# Patient Record
Sex: FEMALE | Race: WHITE | Employment: UNEMPLOYED | URBAN - METROPOLITAN AREA
[De-identification: names, ages, dates, MRNs, and addresses within clinical notes are randomized per-mention and may not be internally consistent; named-entity substitution may affect disease eponyms.]

---

## 2018-09-23 ENCOUNTER — HOSPITAL ENCOUNTER (OUTPATIENT)
Dept: GENERAL RADIOLOGY | Age: 48
Discharge: HOME | End: 2018-09-25
Payer: MEDICARE

## 2018-09-23 ENCOUNTER — HOSPITAL ENCOUNTER (OUTPATIENT)
Age: 48
Discharge: HOME | End: 2018-09-25
Payer: MEDICARE

## 2018-09-23 DIAGNOSIS — N20.0 URIC ACID NEPHROLITHIASIS: ICD-10-CM

## 2018-09-23 PROCEDURE — 74018 RADEX ABDOMEN 1 VIEW: CPT

## 2024-03-27 ENCOUNTER — OFFICE VISIT (OUTPATIENT)
Dept: PODIATRY | Age: 54
End: 2024-03-27
Payer: MEDICARE

## 2024-03-27 VITALS — BODY MASS INDEX: 24.73 KG/M2 | WEIGHT: 131 LBS | HEIGHT: 61 IN

## 2024-03-27 DIAGNOSIS — L84 CORNS AND CALLOSITIES: ICD-10-CM

## 2024-03-27 DIAGNOSIS — M77.41 METATARSALGIA OF BOTH FEET: ICD-10-CM

## 2024-03-27 DIAGNOSIS — B35.1 TINEA UNGUIUM: Primary | ICD-10-CM

## 2024-03-27 DIAGNOSIS — M77.42 METATARSALGIA OF BOTH FEET: ICD-10-CM

## 2024-03-27 PROCEDURE — G8484 FLU IMMUNIZE NO ADMIN: HCPCS | Performed by: PODIATRIST

## 2024-03-27 PROCEDURE — 1036F TOBACCO NON-USER: CPT | Performed by: PODIATRIST

## 2024-03-27 PROCEDURE — 3017F COLORECTAL CA SCREEN DOC REV: CPT | Performed by: PODIATRIST

## 2024-03-27 PROCEDURE — 99203 OFFICE O/P NEW LOW 30 MIN: CPT | Performed by: PODIATRIST

## 2024-03-27 PROCEDURE — G8420 CALC BMI NORM PARAMETERS: HCPCS | Performed by: PODIATRIST

## 2024-03-27 PROCEDURE — 11721 DEBRIDE NAIL 6 OR MORE: CPT | Performed by: PODIATRIST

## 2024-03-27 PROCEDURE — G8427 DOCREV CUR MEDS BY ELIG CLIN: HCPCS | Performed by: PODIATRIST

## 2024-03-27 RX ORDER — ASCORBIC ACID 500 MG
500 TABLET ORAL DAILY
COMMUNITY

## 2024-03-27 RX ORDER — DIVALPROEX SODIUM 500 MG/1
500 TABLET, EXTENDED RELEASE ORAL DAILY
COMMUNITY

## 2024-03-27 RX ORDER — FERROUS SULFATE 325(65) MG
325 TABLET ORAL EVERY 12 HOURS
COMMUNITY

## 2024-03-27 RX ORDER — CLONAZEPAM 1 MG/1
1 TABLET ORAL EVERY 6 HOURS
COMMUNITY

## 2024-03-27 RX ORDER — UBIDECARENONE 75 MG
500 CAPSULE ORAL DAILY
COMMUNITY

## 2024-03-27 RX ORDER — AMMONIUM LACTATE 12 G/100G
LOTION TOPICAL
Qty: 400 G | Refills: 2 | Status: SHIPPED | OUTPATIENT
Start: 2024-03-27

## 2024-03-27 NOTE — PROGRESS NOTES
palpable bilateral.    Capillary Refill Time:  Immediate return  Hair growth:  Symmetrical and bilateral   Skin:  Not atrophic  Edema: Mild edema bilateral feet.   Mild edema bilateral ankles.  Neurologic:  Light touch diminished bilateral.  Warm to coolness bilateral distal toes  Decreased epicritic sensation    Musculoskeletal/ Orthopedic examination:    Equinis: present bilateral  Dorsiflexion, plantarflexion, inversion, eversion bilateral 5 out of 5 muscle strength  Wiggling toes  Negative [facility]    Dermatology examination:    Toenails 1 through 5 bilateral thickened, elongated, dystrophic, mycotic with subungual debris.  Web spaces 1 through 4 bilateral clean dry and intact.  Hyperkeratotic tissue noted        Assessment and Plan:  Mckenna was seen today for callouses and nail problem.    Diagnoses and all orders for this visit:    Tinea unguium    Corns and callosities  -     DME Order for (Specify) as OP    Metatarsalgia of both feet  -     DME Order for (Specify) as OP    Other orders  -     ammonium lactate (LAC-HYDRIN) 12 % lotion; Apply topically twice daily        New referral clinical tinea unguium  Did also write an order for full-length custom orthotics with metatarsal padding.  I did dispense metatarsal padding today.  If custom orthotics were not covered we did discuss power step inserts and placing metatarsal padding on the bottom.  Ammonium lactate 12% twice daily.    Formal debridement toenails 1 through 5 right and left with manual debridement for thickness and overall length.    Follow-up 2 months.      Return in about 2 months (around 5/27/2024).     Seen By:  Dejuan Hernandez DPM      Document was created using voice recognition software.  Note was reviewed however may contain grammatical errors.

## 2024-09-19 ENCOUNTER — OFFICE VISIT (OUTPATIENT)
Dept: PODIATRY | Age: 54
End: 2024-09-19

## 2024-09-19 VITALS — HEIGHT: 61 IN | WEIGHT: 131 LBS | BODY MASS INDEX: 24.73 KG/M2

## 2024-09-19 DIAGNOSIS — M77.42 METATARSALGIA OF BOTH FEET: ICD-10-CM

## 2024-09-19 DIAGNOSIS — B35.1 TINEA UNGUIUM: Primary | ICD-10-CM

## 2024-09-19 DIAGNOSIS — L84 CORNS AND CALLOSITIES: ICD-10-CM

## 2024-09-19 DIAGNOSIS — M77.41 METATARSALGIA OF BOTH FEET: ICD-10-CM

## 2024-09-19 RX ORDER — AMMONIUM LACTATE 12 G/100G
LOTION TOPICAL
Qty: 400 G | Refills: 2 | Status: CANCELLED | OUTPATIENT
Start: 2024-09-19

## 2024-09-19 NOTE — PROGRESS NOTES
Follow-up  Most recent PCP appointment  Natali Gates MD seen 2/26/2024 Electronically signed by Dejuan Hernandez DPM on 10/6/2024 at 7:07 PM       CC:   Painful elongated toenails 1-5 right and left  Follow-up history of custom inserts    HPI  Follow-up elongated toenails both feet.  History of custom inserts.  No significant pain today.  Elongated toenails both feet.  No additional pedal complaints.    ROS:  Const: Denies constitutional symptoms  Musculo: Denies symptoms other than stated above  Skin: Denies symptoms other than stated above      Current Outpatient Medications:     clonazePAM (KLONOPIN) 1 MG tablet, Take 1 tablet by mouth every 6 hours., Disp: , Rfl:     ferrous sulfate (IRON 325) 325 (65 Fe) MG tablet, Take 1 tablet by mouth in the morning and 1 tablet in the evening., Disp: , Rfl:     divalproex (DEPAKOTE ER) 500 MG extended release tablet, Take 1 tablet by mouth daily, Disp: , Rfl:     vitamin C (ASCORBIC ACID) 500 MG tablet, Take 1 tablet by mouth daily, Disp: , Rfl:     vitamin B-12 (CYANOCOBALAMIN) 100 MCG tablet, Take 5 tablets by mouth daily, Disp: , Rfl:     ammonium lactate (LAC-HYDRIN) 12 % lotion, Apply topically twice daily, Disp: 400 g, Rfl: 2  No Known Allergies    No past medical history on file.  There were no vitals filed for this visit.     Work History/Social History:   Foot and ankle history:     Focused Lower Extremity Physical Exam:    Neurovascular examination:    Dorsalis Pedis palpable bilateral.  Posterior tibialis palpable bilateral.    Capillary Refill Time:  Immediate return  Hair growth:  Symmetrical and bilateral   Skin:  Not atrophic  Edema: Mild edema bilateral feet.   Mild edema bilateral ankles.  Neurologic:  Light touch diminished bilateral.  Warm to coolness bilateral distal toes  Decreased epicritic sensation    Musculoskeletal/ Orthopedic examination:    Equinis: present bilateral  Dorsiflexion, plantarflexion, inversion, eversion bilateral 5 out of 5